# Patient Record
Sex: MALE
[De-identification: names, ages, dates, MRNs, and addresses within clinical notes are randomized per-mention and may not be internally consistent; named-entity substitution may affect disease eponyms.]

---

## 2023-07-21 ENCOUNTER — APPOINTMENT (OUTPATIENT)
Dept: VASCULAR SURGERY | Facility: CLINIC | Age: 83
End: 2023-07-21
Payer: MEDICARE

## 2023-07-21 VITALS
HEART RATE: 62 BPM | WEIGHT: 111 LBS | BODY MASS INDEX: 18.49 KG/M2 | DIASTOLIC BLOOD PRESSURE: 60 MMHG | HEIGHT: 65 IN | SYSTOLIC BLOOD PRESSURE: 97 MMHG

## 2023-07-21 DIAGNOSIS — I77.6 ARTERITIS, UNSPECIFIED: ICD-10-CM

## 2023-07-21 DIAGNOSIS — Z87.898 PERSONAL HISTORY OF OTHER SPECIFIED CONDITIONS: ICD-10-CM

## 2023-07-21 DIAGNOSIS — I12.0 HYPERTENSIVE CHRONIC KIDNEY DISEASE WITH STAGE 5 CHRONIC KIDNEY DISEASE OR END STAGE RENAL DISEASE: ICD-10-CM

## 2023-07-21 DIAGNOSIS — I10 ESSENTIAL (PRIMARY) HYPERTENSION: ICD-10-CM

## 2023-07-21 DIAGNOSIS — Z82.61 FAMILY HISTORY OF ARTHRITIS: ICD-10-CM

## 2023-07-21 DIAGNOSIS — Z82.49 FAMILY HISTORY OF ISCHEMIC HEART DISEASE AND OTHER DISEASES OF THE CIRCULATORY SYSTEM: ICD-10-CM

## 2023-07-21 DIAGNOSIS — Z80.0 FAMILY HISTORY OF MALIGNANT NEOPLASM OF DIGESTIVE ORGANS: ICD-10-CM

## 2023-07-21 DIAGNOSIS — R73.03 PREDIABETES.: ICD-10-CM

## 2023-07-21 DIAGNOSIS — N18.5 HYPERTENSIVE CHRONIC KIDNEY DISEASE WITH STAGE 5 CHRONIC KIDNEY DISEASE OR END STAGE RENAL DISEASE: ICD-10-CM

## 2023-07-21 DIAGNOSIS — D64.9 ANEMIA, UNSPECIFIED: ICD-10-CM

## 2023-07-21 PROCEDURE — 93986 DUP-SCAN HEMO COMPL UNI STD: CPT

## 2023-07-21 PROCEDURE — 99204 OFFICE O/P NEW MOD 45 MIN: CPT

## 2023-07-21 NOTE — ASSESSMENT
[FreeTextEntry1] : Patient with renal failure.  Currently not on hemodialysis.\par \par Plan for mid forearm left radiocephalic fistula.\par \par Risks benefits and alternatives were all discussed.\par \par Patient is medical clearance.

## 2023-07-21 NOTE — CONSULT LETTER
[Dear  ___] : Dear  [unfilled], [Please see my note below.] : Please see my note below. [Consult Letter:] : I had the pleasure of evaluating your patient, [unfilled]. [Consult Closing:] : Thank you very much for allowing me to participate in the care of this patient.  If you have any questions, please do not hesitate to contact me. [Sincerely,] : Sincerely, [FreeTextEntry3] : Rei Tristan M.D., F.LISAS., R.P.TAVO.I.\par  of Vascular Surgery\par Assistant Professor of Radiology\par Director of Endovascular Program/ V[facility]\par [facility]

## 2023-07-21 NOTE — HISTORY OF PRESENT ILLNESS
[FreeTextEntry1] : Patient is an 84-year-old gentleman with past medical history significant for renal insufficiency currently not on hemodialysis requiring permanent dialysis access in preparation for dialysis.\par \par Patient is right-handed.\par \par No significant coronary artery disease or smoking.\par \par

## 2023-07-21 NOTE — PHYSICAL EXAM
[JVD] : no jugular venous distention  [2+] : left 2+ [Ankle Swelling (On Exam)] : not present [Abdomen Masses] : No abdominal masses

## 2024-07-12 ENCOUNTER — APPOINTMENT (OUTPATIENT)
Dept: VASCULAR SURGERY | Facility: CLINIC | Age: 84
End: 2024-07-12

## 2024-07-12 ENCOUNTER — APPOINTMENT (OUTPATIENT)
Dept: VASCULAR SURGERY | Facility: CLINIC | Age: 84
End: 2024-07-12
Payer: MEDICARE

## 2024-07-12 VITALS
BODY MASS INDEX: 17.99 KG/M2 | WEIGHT: 108 LBS | SYSTOLIC BLOOD PRESSURE: 135 MMHG | OXYGEN SATURATION: 98 % | HEIGHT: 65 IN | HEART RATE: 51 BPM | DIASTOLIC BLOOD PRESSURE: 72 MMHG

## 2024-07-12 PROCEDURE — 99214 OFFICE O/P EST MOD 30 MIN: CPT

## 2024-07-12 PROCEDURE — G2211 COMPLEX E/M VISIT ADD ON: CPT

## 2024-07-12 PROCEDURE — 93986 DUP-SCAN HEMO COMPL UNI STD: CPT

## 2024-07-12 RX ORDER — FINASTERIDE 1 MG/1
TABLET ORAL
Refills: 0 | Status: ACTIVE | COMMUNITY

## 2024-07-12 RX ORDER — ALLOPURINOL 200 MG/1
TABLET ORAL
Refills: 0 | Status: ACTIVE | COMMUNITY

## 2024-07-12 RX ORDER — ATENOLOL 50 MG/1
TABLET ORAL
Refills: 0 | Status: ACTIVE | COMMUNITY

## 2024-07-12 RX ORDER — LOPERAMIDE HCL 2 MG
CAPSULE ORAL
Refills: 0 | Status: ACTIVE | COMMUNITY

## 2024-07-12 RX ORDER — ATORVASTATIN CALCIUM 80 MG/1
TABLET, FILM COATED ORAL
Refills: 0 | Status: ACTIVE | COMMUNITY

## 2024-07-19 ENCOUNTER — OUTPATIENT (OUTPATIENT)
Dept: OUTPATIENT SERVICES | Facility: HOSPITAL | Age: 84
LOS: 1 days | End: 2024-07-19
Payer: COMMERCIAL

## 2024-07-19 VITALS
WEIGHT: 110.89 LBS | HEIGHT: 65 IN | HEART RATE: 62 BPM | TEMPERATURE: 98 F | OXYGEN SATURATION: 97 % | RESPIRATION RATE: 17 BRPM | DIASTOLIC BLOOD PRESSURE: 75 MMHG | SYSTOLIC BLOOD PRESSURE: 127 MMHG

## 2024-07-19 DIAGNOSIS — I12.0 HYPERTENSIVE CHRONIC KIDNEY DISEASE WITH STAGE 5 CHRONIC KIDNEY DISEASE OR END STAGE RENAL DISEASE: ICD-10-CM

## 2024-07-19 DIAGNOSIS — Z96.641 PRESENCE OF RIGHT ARTIFICIAL HIP JOINT: Chronic | ICD-10-CM

## 2024-07-19 DIAGNOSIS — Z98.89 OTHER SPECIFIED POSTPROCEDURAL STATES: Chronic | ICD-10-CM

## 2024-07-19 DIAGNOSIS — Z01.818 ENCOUNTER FOR OTHER PREPROCEDURAL EXAMINATION: ICD-10-CM

## 2024-07-19 DIAGNOSIS — E78.5 HYPERLIPIDEMIA, UNSPECIFIED: ICD-10-CM

## 2024-07-19 DIAGNOSIS — N18.5 CHRONIC KIDNEY DISEASE, STAGE 5: ICD-10-CM

## 2024-07-19 DIAGNOSIS — I10 ESSENTIAL (PRIMARY) HYPERTENSION: ICD-10-CM

## 2024-07-19 DIAGNOSIS — Z98.890 OTHER SPECIFIED POSTPROCEDURAL STATES: Chronic | ICD-10-CM

## 2024-07-19 DIAGNOSIS — N40.0 BENIGN PROSTATIC HYPERPLASIA WITHOUT LOWER URINARY TRACT SYMPTOMS: ICD-10-CM

## 2024-07-19 PROCEDURE — 93010 ELECTROCARDIOGRAM REPORT: CPT

## 2024-07-19 PROCEDURE — 71046 X-RAY EXAM CHEST 2 VIEWS: CPT | Mod: 26

## 2024-07-19 NOTE — H&P PST ADULT - NSICDXPASTSURGICALHX_GEN_ALL_CORE_FT
PAST SURGICAL HISTORY:  H/O colonoscopy     H/O right inguinal hernia repair     S/P bilateral breast reduction for gynecomastia    S/P hip replacement, right

## 2024-07-19 NOTE — H&P PST ADULT - NSICDXPROCEDURE_GEN_ALL_CORE_FT
PROCEDURES:  Anastomosis, arteriovenous, forearm vein transposition 19-Jul-2024 09:59:12  Mandi Mendez

## 2024-07-19 NOTE — H&P PST ADULT - NSANTHOSAYNRD_GEN_A_CORE
No. ISA screening performed.  STOP BANG Legend: 0-2 = LOW Risk; 3-4 = INTERMEDIATE Risk; 5-8 = HIGH Risk

## 2024-07-19 NOTE — H&P PST ADULT - NSICDXFAMILYHX_GEN_ALL_CORE_FT
FAMILY HISTORY:  Mother  Still living? Unknown  Family history of heart attack, Age at diagnosis: Age Unknown    Sibling  Still living? Unknown  Family history of heart disease, Age at diagnosis: Age Unknown

## 2024-07-19 NOTE — H&P PST ADULT - PROBLEM SELECTOR PLAN 3
scheduled for creation of left radiocephalic AV fistula     Pt instructed to be NPO the night before and the morning of surgery except po med. Provided with chlorhexidene 4% solution to wash for 3 days including the morning of surgery. Written instructions given and reviewed with pt. Tylenol to be used if needed . Escort required     ISA=3

## 2024-07-19 NOTE — H&P PST ADULT - NSICDXPASTMEDICALHX_GEN_ALL_CORE_FT
PAST MEDICAL HISTORY:  BPH (benign prostatic hyperplasia)     Gout     HTN (hypertension)     Hyperlipemia     Seasonal allergies     Stage 5 chronic kidney disease not on chronic dialysis

## 2024-07-19 NOTE — H&P PST ADULT - ASSESSMENT
84 yr old male frail history of  HTN, HDL, BPH, presents with stage 5 CKD not on dialysis. Pt 's kidney functions have been monitor due to elevations from blood work. Pt consulted with vascular and is scheduled for Creation of Left radiocephalic AV fistula on 7/25/2024. Pt had blood work done. EKG and Chest X-ray done in PST. Pt to obtain medical clearance from Dr. Quigley [phone]

## 2024-07-19 NOTE — H&P PST ADULT - HISTORY OF PRESENT ILLNESS
84 yr old male frail history of  HTN, HDL, BPH, presents with stage 5 CKD not on dialysis. Pt 's kidney functions have been monitor due to elevations from blood work. Pt consulted with vascular and is scheduled for Creation of Left radiocephalic AV fistula on 7/25/2024. Pt had blood work done. EKG and Chest X-ray done in PST. Pt to obtain medical clearance from Dr. Quigley [phone]

## 2024-07-22 PROBLEM — N18.5 CHRONIC KIDNEY DISEASE, STAGE 5: Chronic | Status: ACTIVE | Noted: 2024-07-19

## 2024-07-22 PROCEDURE — G0463: CPT

## 2024-07-22 PROCEDURE — 71046 X-RAY EXAM CHEST 2 VIEWS: CPT

## 2024-07-22 PROCEDURE — 93005 ELECTROCARDIOGRAM TRACING: CPT

## 2024-07-25 ENCOUNTER — APPOINTMENT (OUTPATIENT)
Dept: VASCULAR SURGERY | Facility: HOSPITAL | Age: 84
End: 2024-07-25

## 2024-07-25 ENCOUNTER — TRANSCRIPTION ENCOUNTER (OUTPATIENT)
Age: 84
End: 2024-07-25

## 2024-07-25 PROCEDURE — 36820 AV FUSION/FOREARM VEIN: CPT | Mod: LT

## 2024-07-25 PROCEDURE — 35321 RECHANNELING OF ARTERY: CPT | Mod: LT

## 2024-07-25 RX ORDER — DORZOLAMIDE HCL/TIMOLOL MALEAT 22.3-6.8/1
1 DROPS OPHTHALMIC (EYE)
Refills: 0 | DISCHARGE

## 2024-07-30 ENCOUNTER — EMERGENCY (EMERGENCY)
Facility: HOSPITAL | Age: 84
LOS: 1 days | Discharge: HOME | End: 2024-07-30
Attending: EMERGENCY MEDICINE
Payer: COMMERCIAL

## 2024-07-30 VITALS
HEIGHT: 65 IN | SYSTOLIC BLOOD PRESSURE: 122 MMHG | DIASTOLIC BLOOD PRESSURE: 77 MMHG | WEIGHT: 110.89 LBS | HEART RATE: 60 BPM | TEMPERATURE: 98 F | OXYGEN SATURATION: 98 % | RESPIRATION RATE: 18 BRPM

## 2024-07-30 VITALS
HEART RATE: 55 BPM | OXYGEN SATURATION: 98 % | TEMPERATURE: 98 F | DIASTOLIC BLOOD PRESSURE: 80 MMHG | SYSTOLIC BLOOD PRESSURE: 130 MMHG | RESPIRATION RATE: 17 BRPM

## 2024-07-30 DIAGNOSIS — Z98.89 OTHER SPECIFIED POSTPROCEDURAL STATES: Chronic | ICD-10-CM

## 2024-07-30 DIAGNOSIS — Z96.641 PRESENCE OF RIGHT ARTIFICIAL HIP JOINT: Chronic | ICD-10-CM

## 2024-07-30 DIAGNOSIS — Z98.890 OTHER SPECIFIED POSTPROCEDURAL STATES: Chronic | ICD-10-CM

## 2024-07-30 PROCEDURE — 99283 EMERGENCY DEPT VISIT LOW MDM: CPT

## 2024-07-30 PROCEDURE — 99284 EMERGENCY DEPT VISIT MOD MDM: CPT

## 2024-07-30 RX ORDER — AMOXICILLIN/POTASSIUM CLAV 250-125 MG
1 TABLET ORAL ONCE
Refills: 0 | Status: COMPLETED | OUTPATIENT
Start: 2024-07-30 | End: 2024-07-30

## 2024-07-30 RX ORDER — HYDROCORTISONE VALERATE 0.2 %
1 CREAM (GRAM) TOPICAL ONCE
Refills: 0 | Status: COMPLETED | OUTPATIENT
Start: 2024-07-30 | End: 2024-07-30

## 2024-07-30 RX ORDER — CEFUROXIME SODIUM 7.5 G
1 VIAL (EA) INTRAVENOUS
Qty: 7 | Refills: 0
Start: 2024-07-30 | End: 2024-08-05

## 2024-07-30 RX ADMIN — Medication 1 APPLICATION(S): at 20:12

## 2024-07-30 RX ADMIN — Medication 1 TABLET(S): at 19:44

## 2024-07-30 NOTE — ED ADULT NURSE NOTE - OBJECTIVE STATEMENT
Pt presents to the ED s/p left AV fistula insertion on 07/25/2024. Presents today c/o itchy rash and redness to the site. Pt denies fevers, chills, pain, SOB and chest pain. Pt presents to the ED s/p left AV fistula insertion on 07/25/2024. Presents today c/o itchy rash and redness to the site x 2 days ago. Pt denies fevers, chills, pain, SOB and chest pain.

## 2024-07-30 NOTE — ED PROVIDER NOTE - NSFOLLOWUPCLINICS_GEN_ALL_ED_FT
[facility]  Internal Medicine  [address] [place] [address]  Phone: [phone]  Fax: [phone]

## 2024-07-30 NOTE — ED PROVIDER NOTE - OBJECTIVE STATEMENT
84-year-old male who lives alone with history of CKD, HTN, gout, BPH, HLD.  Patient had AV graft placed on 7/25.  He complained of itchiness around the graft site since yesterday, he denies pain, pustule discharge, fever. patient also endorsed with sneezing, chills on Friday after he ishe has AV graft placement, increased fatigue.  He denies myalgia, coughing, SOB.  He claims he took throat loss injures and now is getting much better 84-year-old male who lives alone with history of CKD, HTN, gout, BPH, HLD.  Patient had AV graft placed on 7/25.  He complained of itchiness around the graft site since yesterday, he denies pain, pustule discharge, fever. patient also endorsed with sneezing, chills on Friday after he ishe has AV graft placement, increased fatigue.  He denies myalgia, coughing, SOB.  He claims he took throat lozenges and now is getting much better

## 2024-07-30 NOTE — ED PROVIDER NOTE - CARE PROVIDER_API CALL
Rei Tristan  Vascular Surgery  [address], [address]  [place] [address]  Phone: [phone]  Fax: [phone]  Scheduled Appointment: 08/02/2024

## 2024-07-30 NOTE — ED PROVIDER NOTE - CLINICAL SUMMARY MEDICAL DECISION MAKING FREE TEXT BOX
84-year-old male with recent AV graft placement, now complaining of itchiness around the graft site.  His graft site does not appears to be infected, nontender and no discharge.  Will give hydrocortisone cream and discussed with vascular surgeon to follow-up with patient sooner

## 2024-07-30 NOTE — ED PROVIDER NOTE - PATIENT PORTAL LINK FT
You can access the FollowMyHealth Patient Portal offered by [facility] by registering at the following website: [email or web address]. By joining [facility]’s FollowMyHealth portal, you will also be able to view your health information using other applications (apps) compatible with our system.

## 2024-07-30 NOTE — ED ADULT NURSE NOTE - NSFALLUNIVINTERV_ED_ALL_ED
Bed/Stretcher in lowest position, wheels locked, appropriate side rails in place/Call bell, personal items and telephone in reach/Instruct patient to call for assistance before getting out of bed/chair/stretcher/Non-slip footwear applied when patient is off stretcher/[place] to call system/Physically safe environment - no spills, clutter or unnecessary equipment/Purposeful proactive rounding/Room/bathroom lighting operational, light cord in reach

## 2024-07-30 NOTE — ED PROVIDER NOTE - PROGRESS NOTE DETAILS
Case discussed with patient's vascular surgeon Dr. Tristan,  Advised to give patient antibiotic, he will see patient on Friday in the office

## 2024-07-30 NOTE — ED PROVIDER NOTE - CHIEF COMPLAINT
Currently without chest pain  Continue aspirin, beta-blocker, statin   The patient is a 84y Male complaining of

## 2024-07-30 NOTE — ED PROVIDER NOTE - NSFOLLOWUPINSTRUCTIONS_ED_ALL_ED_FT
Acute Rash    WHAT YOU NEED TO KNOW:    What is an acute rash? A rash is irritated, red, or itchy skin or mucus membranes, such as the lining of your nose or throat. Acute means the rash starts suddenly, worsens quickly, and lasts a short time.    What are some common types of rashes?    Eczema causes inflamed, itchy areas. Your skin may be dry, scaly, and thick. The outer layer may be damaged. Irritants, stress, or a family history of eczema make you more likely to get it.    Contact dermatitis causes a small, itchy growth that may be flat or raised. It appears after you touch something that damages your skin or causes an allergic reaction. Examples include chemicals, metals, dye, soaps or detergents, and latex.    Atopic dermatitis causes small, itchy, blister-like growths along skin lines and folds. The growths may ooze fluid and become scaly, crusted, or hard. You may have sore, dry skin or swollen eyes. This rash usually forms after you are around an allergen, are overheated, or wear rough clothing.    Urticaria (hives) appears suddenly as patches and raised areas of swollen skin or mucus membrane. The area may itch or burn. Common causes include allergens, latex, certain foods, a bee sting, smoke, or a blood transfusion.    Pityriasis rosea may appear before you get a disease caused by bacteria or a virus. The rash may look like a patch on your chest, back, or abdomen. The rash may spread to become small, red, cone-shaped bumps that usually grow in groups.  How is an acute rash diagnosed? Your healthcare provider may know what kind of rash you have by looking at it. Tell him or her when and where the rash first appeared. Describe how often you get the rash and if anything causes it, such as food, activity, or stress. Give your provider a list of your medicines, allergies, and health conditions. Include any family history of rashes. A dermatologist (skin specialist) may help find the cause of your rash.    How is an acute rash treated? Treatment will depend on the condition causing your acute rash. You may need any of the following:    Medicines may be used to decrease itching or inflammation, or prevent or treat a bacterial infection. Medicines may also help your immune system fight infection or stop it from attacking your skin.    Ultraviolet phototherapy means the rash is put under light. Light therapy helps treats atopic dermatitis or eczema that does not get better with steroids. It can help pityriasis rosea heal faster and decrease itching.  What can I do to help prevent a rash or care for my skin when I have a rash? Dry skin can lead to more problems. Do not scratch your skin if it itches. You may cause a skin infection by scratching. The following may prevent dry skin, and help your skin look better:    Help soothe your rash. Apply thick cream lotions or petroleum jelly. Cool compresses may also soothe your skin. Apply a cool compress or a cool, wet towel, and then cover it with a dry towel.    Use lukewarm water when you bathe. Hot water may damage your skin more. Pat your skin dry. Do not rub your skin with a towel.    Use detergents, soaps, shampoos, and bubble baths made for sensitive skin.    Wear clothes made of cotton instead of nylon or wool. Cotton is softer, so it will not hurt your skin as much.  When should I seek immediate care?    You have sudden trouble breathing or chest pain.    You are vomiting, have a headache, your throat hurts, or your muscles are painful.  When should I call my doctor?    You have a fever.    You get a cough or cold, or your eyes are red and swollen.    You get open wounds from scratching your skin, or you have a wound that is red, swollen, or painful.    You get sores or blisters in your mouth or genital area, or the skin in those areas is peeling off.    You have new signs or symptoms while being treated with medicines.    You have swelling or pain in your joints.    Your rash lasts longer than 3 months.  CARE AGREEMENT:    You have the right to help plan your care. Learn about your health condition and how it may be treated. Discuss treatment options with your healthcare providers to decide what care you want to receive. You always have the right to refuse treatment.     apply hydrocortisone cream more mouth around your graft site 2 times a day as needed   take cefuroxime 1 tablet once a day for 7 days   see Dr. Tristan on Friday   give the office a call tomorrow for an appointment [phone]

## 2024-07-30 NOTE — ED PROVIDER NOTE - CARE PROVIDERS DIRECT ADDRESSES
[email or web address]

## 2024-08-02 ENCOUNTER — APPOINTMENT (OUTPATIENT)
Dept: VASCULAR SURGERY | Facility: CLINIC | Age: 84
End: 2024-08-02
Payer: MEDICARE

## 2024-08-02 ENCOUNTER — APPOINTMENT (OUTPATIENT)
Dept: VASCULAR SURGERY | Facility: CLINIC | Age: 84
End: 2024-08-02

## 2024-08-02 VITALS
HEIGHT: 65 IN | HEART RATE: 58 BPM | WEIGHT: 109 LBS | SYSTOLIC BLOOD PRESSURE: 101 MMHG | DIASTOLIC BLOOD PRESSURE: 60 MMHG | BODY MASS INDEX: 18.16 KG/M2

## 2024-08-02 PROCEDURE — 99024 POSTOP FOLLOW-UP VISIT: CPT

## 2024-08-02 PROCEDURE — 93990 DOPPLER FLOW TESTING: CPT

## 2024-08-02 NOTE — REASON FOR VISIT
[de-identified] : Patient with renal failure currently not on dialysis.  Status post left radiocephalic fistula.  Incision is clean.  Pulsatile flow over the juxta anastomotic area.  Duplex showed short segment occlusion in the mid forearm.  Plan for fistulogram and thrombectomy of that portion.

## 2024-08-06 ENCOUNTER — APPOINTMENT (OUTPATIENT)
Dept: ENDOVASCULAR SURGERY | Facility: CLINIC | Age: 84
End: 2024-08-06

## 2024-08-06 ENCOUNTER — RESULT REVIEW (OUTPATIENT)
Age: 84
End: 2024-08-06

## 2024-08-06 PROBLEM — Z87.898 HISTORY OF GYNECOMASTIA: Status: RESOLVED | Noted: 2023-07-21 | Resolved: 2024-08-06

## 2024-08-06 PROCEDURE — 36905Z: CUSTOM | Mod: 58

## 2024-08-06 NOTE — HISTORY OF PRESENT ILLNESS
[] : left radiocephalic fistula [FreeTextEntry1] : creation Dr charlton 7-25-24 [FreeTextEntry4] : not on dialysis  [FreeTextEntry5] : yesterday at 6pm [FreeTextEntry6] : Dr Simpson

## 2024-08-06 NOTE — PAST MEDICAL HISTORY
[Increasing age ( >40 years old)] : Increasing age ( >40 years old) [No therapy indicated for cases scheduled for less than one hour] : No therapy indicated for cases scheduled for less than one hour. [FreeTextEntry1] : Malignant Hyperthermia Screening Tool and Risk of Bleeding Assessment   NICKY MCNAMARA  denies family of unexpected death following Anesthesia or Exercise. Denies family history of Malignant Hyperthermia, Muscle or Neuromuscular disorder and High Temperature following exercise.   NICKY MCNAMARA Patient denies history of Muscle Spasm, Dark or Chocolate- Colored and unanticipated fever immediately following anesthesia or serious exercise.   NICKY MCNAMARA Patient also denies bleeding tendencies/risks of bleeding.

## 2024-08-06 NOTE — ASSESSMENT
[Other: _____] : [unfilled] [FreeTextEntry1] : office duplex show a short segment occulsion at the mid forearm - plan for declot thrombectomy

## 2024-08-06 NOTE — PROCEDURE
[D/C IV on discharge] : D/C IV on discharge [Resume diet] : resume diet [Site check for bleeding/hematoma/thrill/bruit] : Site check for bleeding/hematoma/thrill/bruit [Vital signs on admission the q 15 mins x2] : Vital signs on admission the q 15 mins x2 [FreeTextEntry1] : Left fistula, thrombectomy

## 2024-08-23 ENCOUNTER — APPOINTMENT (OUTPATIENT)
Dept: VASCULAR SURGERY | Facility: CLINIC | Age: 84
End: 2024-08-23
Payer: MEDICARE

## 2024-08-23 DIAGNOSIS — N18.4 CHRONIC KIDNEY DISEASE, STAGE 4 (SEVERE): ICD-10-CM

## 2024-08-23 PROCEDURE — 93990 DOPPLER FLOW TESTING: CPT

## 2024-08-23 PROCEDURE — 99024 POSTOP FOLLOW-UP VISIT: CPT

## 2024-08-23 NOTE — REASON FOR VISIT
[de-identified] : Patient with renal insufficiency.  Currently not on dialysis.  Status post left radiocephalic fistula.  Patient had a thrombosed fistula and underwent thrombectomy percutaneously with good results.  Currently patient has a patent fistula with a thrill with severe stenosis in the midportion.  Will schedule the patient for fistulogram and maturation.

## 2024-09-11 ENCOUNTER — RESULT REVIEW (OUTPATIENT)
Age: 84
End: 2024-09-11

## 2024-09-11 ENCOUNTER — APPOINTMENT (OUTPATIENT)
Dept: ENDOVASCULAR SURGERY | Facility: CLINIC | Age: 84
End: 2024-09-11

## 2024-09-11 VITALS
OXYGEN SATURATION: 97 % | HEIGHT: 61 IN | DIASTOLIC BLOOD PRESSURE: 71 MMHG | TEMPERATURE: 97.9 F | SYSTOLIC BLOOD PRESSURE: 139 MMHG | BODY MASS INDEX: 20.39 KG/M2 | WEIGHT: 108 LBS | HEART RATE: 47 BPM | RESPIRATION RATE: 16 BRPM

## 2024-09-11 PROCEDURE — 76937 US GUIDE VASCULAR ACCESS: CPT

## 2024-09-11 PROCEDURE — 36903Z: CUSTOM | Mod: 58

## 2024-09-11 NOTE — HISTORY OF PRESENT ILLNESS
[] : left radiocephalic fistula [FreeTextEntry1] : creation Dr charlton 7-25-24 [FreeTextEntry4] : not on dialysis  [FreeTextEntry5] : 500am this morning [FreeTextEntry6] : Dr Simpson

## 2024-09-11 NOTE — PROCEDURE
[D/C IV on discharge] : D/C IV on discharge [Resume diet] : resume diet [Site check for bleeding/hematoma/thrill/bruit] : Site check for bleeding/hematoma/thrill/bruit [Vital signs on admission the q 15 mins x2] : Vital signs on admission the q 15 mins x2 [FreeTextEntry1] : Left fistula,  fistulogram angioplasty and stent

## 2024-09-11 NOTE — ASSESSMENT
[Other: _____] : [unfilled] [FreeTextEntry1] : Patient with renal insufficiency. Currently not on dialysis. Status post left radiocephalic fistula.  Patient had a thrombosed fistula and underwent thrombectomy percutaneously with good results.  stenosis on duplex-Plan for fistulogram and maturation.

## 2024-09-27 ENCOUNTER — APPOINTMENT (OUTPATIENT)
Dept: VASCULAR SURGERY | Facility: CLINIC | Age: 84
End: 2024-09-27